# Patient Record
Sex: MALE | Race: BLACK OR AFRICAN AMERICAN | Employment: PART TIME | URBAN - METROPOLITAN AREA
[De-identification: names, ages, dates, MRNs, and addresses within clinical notes are randomized per-mention and may not be internally consistent; named-entity substitution may affect disease eponyms.]

---

## 2024-10-30 ENCOUNTER — OFFICE VISIT (OUTPATIENT)
Facility: CLINIC | Age: 24
End: 2024-10-30

## 2024-10-30 DIAGNOSIS — R21 SKIN RASH: ICD-10-CM

## 2024-10-30 DIAGNOSIS — Z13.0 SCREENING FOR DEFICIENCY ANEMIA: ICD-10-CM

## 2024-10-30 DIAGNOSIS — Z13.29 SCREENING FOR THYROID DISORDER: ICD-10-CM

## 2024-10-30 DIAGNOSIS — Z72.0 TOBACCO USE: ICD-10-CM

## 2024-10-30 DIAGNOSIS — Z13.89 SCREENING FOR HEMATURIA OR PROTEINURIA: ICD-10-CM

## 2024-10-30 DIAGNOSIS — Z11.3 SCREEN FOR STD (SEXUALLY TRANSMITTED DISEASE): ICD-10-CM

## 2024-10-30 DIAGNOSIS — I86.1 VARICOCELE: ICD-10-CM

## 2024-10-30 DIAGNOSIS — Z76.89 ENCOUNTER TO ESTABLISH CARE: Primary | ICD-10-CM

## 2024-10-30 DIAGNOSIS — E55.9 VITAMIN D DEFICIENCY: ICD-10-CM

## 2024-10-30 DIAGNOSIS — Z13.220 SCREENING CHOLESTEROL LEVEL: ICD-10-CM

## 2024-10-30 DIAGNOSIS — Z13.1 SCREENING FOR DIABETES MELLITUS: ICD-10-CM

## 2024-10-30 RX ORDER — TRIAMCINOLONE ACETONIDE 0.25 MG/G
OINTMENT TOPICAL
Qty: 15 G | Refills: 0 | Status: SHIPPED | OUTPATIENT
Start: 2024-10-30 | End: 2024-11-06

## 2024-10-30 SDOH — HEALTH STABILITY: PHYSICAL HEALTH: ON AVERAGE, HOW MANY MINUTES DO YOU ENGAGE IN EXERCISE AT THIS LEVEL?: 60 MIN

## 2024-10-30 SDOH — HEALTH STABILITY: PHYSICAL HEALTH: ON AVERAGE, HOW MANY DAYS PER WEEK DO YOU ENGAGE IN MODERATE TO STRENUOUS EXERCISE (LIKE A BRISK WALK)?: 2 DAYS

## 2024-10-30 ASSESSMENT — ENCOUNTER SYMPTOMS
COUGH: 0
DIARRHEA: 0
VOMITING: 0
ABDOMINAL PAIN: 0
NAUSEA: 0
CONSTIPATION: 0
SORE THROAT: 0

## 2024-10-30 NOTE — PATIENT INSTRUCTIONS
Call [phone] to schedule your imaging study with Johnny STEPHENSON: You may receive a patient survey; the provider rating is based on your encounter with me specifically and NOT the staff/facility. Looking forward to your comments.          Patient Information     [facility] is dedicated to improving your health. We are excited to have you as a patient. To provide the best care, we need a good plan. To optimize your safety and care, we ask you to follow and be aware of some important policies and procedures.    A. Arrive 20 minutes prior to your appointment. Arriving prior to your scheduled medical visit allows time to complete check- in paperwork prior to seeing the physician.    B. Not showing-up for an appointment without letting your provider know leaves the practice in a difficult position and prevents other patients from being able to use the appointment slot. We are always trying to provide better access to our patients and this will help us in that goal.    C. If you are unable to keep an appointment, please call 24 hours before your appointment if at all possible. Another patient needing care might be served in the event you cannot make your appointment.    D. Bring all of your medication bottles (except those that must be refrigerated) and supplements with you to your appointment. This enables the provider to accurately assess medication needs and make important changes as needed.    E. Please seek to get your medications refilled during your scheduled appointments. This will decrease wait time for refills to be processed. When you bring all medications with you the day of your appointment, we will prescribe enough medications to last until your next appointment. Prescriptions cannot be filled after office hours, on weekends/holidays, or any other time the office is closed.    F. If you have any forms to be completed, please mention that when making your appointment. Please send the

## 2024-10-30 NOTE — PROGRESS NOTES
[address] [place] [address]               [phone]        Js Rowland is a 23 y.o. male and presents with New Patient           Assessment/Plan:  Js was seen today for new patient.    Diagnoses and all orders for this visit:    Encounter to establish care    Varicocele  -     Cancel: [facility]  -     US SCROTUM AND TESTICLES; Future    Screening for diabetes mellitus  -     Hemoglobin A1C; Future  -     Comprehensive Metabolic Panel; Future    Screening cholesterol level  -     Lipid Panel; Future    Screening for hematuria or proteinuria  -     Urinalysis with Microscopic; Future    Screening for thyroid disorder  -     TSH; Future    Screening for deficiency anemia  -     CBC with Auto Differential; Future    Screen for STD (sexually transmitted disease)  -     RPR; Future  -     HSV 1 and 2 Specific Ab, IgG; Future  -     HIV 1/2 Ag/Ab, 4TH Generation,W Rflx Confirm; Future  -     Hepatitis C Ab, Rflx to Qt by PCR; Future  -     Chlamydia, Gonorrhea, Trichomoniasis; Future    Vitamin D deficiency  -     Vitamin D 25 Hydroxy; Future    Skin rash  -     triamcinolone (KENALOG) 0.025 % ointment; Apply topically 2 times daily max 2 weeks    Tobacco use      Update imaging to further assess; recommend supportive care; no longer tender today;     Screening labs ordered    Update STD screen; continue use of condoms    Skin rash: triamcinolone recommended topically; max 2 weeks use; encouraged aquaphor/vaseline topically.    Encouraged tobacco cessation      Follow up and disposition:   Return in about 1 week (around 11/6/2024), or if symptoms worsen or fail to improve, for labs, follow up -15 min, review lab results.      Health Maintenance:   Health Maintenance   Topic Date Due    Varicella vaccine (2 of 2 - 2-dose childhood series) 12/29/2006    Depression Screen  Never done    HPV vaccine (1 - Male 3-dose series) Never done    HIV

## 2024-11-06 ENCOUNTER — OFFICE VISIT (OUTPATIENT)
Facility: CLINIC | Age: 24
End: 2024-11-06
Payer: MEDICAID

## 2024-11-06 DIAGNOSIS — Z72.0 TOBACCO USE: ICD-10-CM

## 2024-11-06 DIAGNOSIS — B35.6 TINEA CRURIS: ICD-10-CM

## 2024-11-06 DIAGNOSIS — R21 SKIN RASH: Primary | ICD-10-CM

## 2024-11-06 PROCEDURE — 99214 OFFICE O/P EST MOD 30 MIN: CPT | Performed by: FAMILY MEDICINE

## 2024-11-06 RX ORDER — CLOTRIMAZOLE 1 %
CREAM (GRAM) TOPICAL
Qty: 60 G | Refills: 1 | Status: SHIPPED | OUTPATIENT
Start: 2024-11-06 | End: 2024-11-13

## 2024-11-06 SDOH — ECONOMIC STABILITY: FOOD INSECURITY: WITHIN THE PAST 12 MONTHS, THE FOOD YOU BOUGHT JUST DIDN'T LAST AND YOU DIDN'T HAVE MONEY TO GET MORE.: NEVER TRUE

## 2024-11-06 SDOH — ECONOMIC STABILITY: FOOD INSECURITY: WITHIN THE PAST 12 MONTHS, YOU WORRIED THAT YOUR FOOD WOULD RUN OUT BEFORE YOU GOT MONEY TO BUY MORE.: NEVER TRUE

## 2024-11-06 SDOH — ECONOMIC STABILITY: INCOME INSECURITY: HOW HARD IS IT FOR YOU TO PAY FOR THE VERY BASICS LIKE FOOD, HOUSING, MEDICAL CARE, AND HEATING?: NOT HARD AT ALL

## 2024-11-06 ASSESSMENT — PATIENT HEALTH QUESTIONNAIRE - PHQ9
SUM OF ALL RESPONSES TO PHQ QUESTIONS 1-9: 0
SUM OF ALL RESPONSES TO PHQ QUESTIONS 1-9: 0
1. LITTLE INTEREST OR PLEASURE IN DOING THINGS: NOT AT ALL
2. FEELING DOWN, DEPRESSED OR HOPELESS: NOT AT ALL
SUM OF ALL RESPONSES TO PHQ QUESTIONS 1-9: 0
SUM OF ALL RESPONSES TO PHQ9 QUESTIONS 1 & 2: 0
SUM OF ALL RESPONSES TO PHQ QUESTIONS 1-9: 0

## 2024-11-06 NOTE — PROGRESS NOTES
[address] [place] [address]               [phone]        Js Rowland is a 23 y.o. male and presents with Follow-up           Assessment/Plan:  Js was seen today for follow-up.    Diagnoses and all orders for this visit:    Skin rash    Tobacco use    Tinea cruris  -     clotrimazole (LOTRIMIN AF) 1 % cream; Apply topically 2 times daily prn jock itch        Skin rash: encouraged to apply triamcinolone to neck; limit use in axilla now that rash has improved  Begin antifungal for tinea cruris; admits to sweating/not washing as regularly; encouraged to pat dry to avoid moisture retention; f/u in 2 weeks if still symptomatic  Encouraged tobacco cessation    Follow up and disposition:   Return in about 8 days (around 11/14/2024), or if symptoms worsen or fail to improve, for labs/ 12/4/2024, review lab results.      Health Maintenance:   Health Maintenance   Topic Date Due    Varicella vaccine (2 of 2 - 2-dose childhood series) 12/29/2006    Pneumococcal 0-64 years Vaccine (1 of 2 - PCV) Never done    Depression Screen  Never done    HPV vaccine (1 - Male 3-dose series) Never done    HIV screen  Never done    Hepatitis C screen  Never done    Hepatitis B vaccine (1 of 3 - 19+ 3-dose series) Never done    DTaP/Tdap/Td vaccine (6 - Td or Tdap) 09/03/2023    Flu vaccine (1) 08/01/2024    COVID-19 Vaccine (1 - 2023-24 season) Never done    Hepatitis A vaccine  Completed    Hib vaccine  Completed    Polio vaccine  Completed    Meningococcal (ACWY) vaccine  Aged Out        Subjective     22 y/o male here for follow up  Has not completed labs preordered from last visit  Encouraged tobacco cessation last visit; not ready to quit    Given triamcinolone for skin rash last visit--just started taking it 3 days ago; states triamcinolone has been helping; states he has been using it once/day and symptoms improving but not fully resolved    States he has noted itching/burning

## 2024-11-14 ENCOUNTER — HOSPITAL ENCOUNTER (OUTPATIENT)
Facility: HOSPITAL | Age: 24
Setting detail: SPECIMEN
Discharge: HOME | End: 2024-11-17

## 2024-11-14 LAB — SENTARA SPECIMEN COLLECTION: NORMAL

## 2024-11-14 PROCEDURE — 99001 SPECIMEN HANDLING PT-LAB: CPT

## 2024-11-15 LAB
A/G RATIO: 2.3 RATIO (ref 1.1–2.6)
ALBUMIN: 4.8 G/DL (ref 3.5–5)
ALP BLD-CCNC: 64 U/L (ref 25–115)
ALT SERPL-CCNC: 23 U/L (ref 5–40)
ANION GAP SERPL CALCULATED.3IONS-SCNC: 12 MMOL/L (ref 3–15)
AST SERPL-CCNC: 21 U/L (ref 10–37)
BACTERIA: NEGATIVE
BASOPHILS ABSOLUTE: 0 K/UL (ref 0–0.2)
BASOPHILS RELATIVE PERCENT: 1 % (ref 0–2)
BILIRUB SERPL-MCNC: 0.9 MG/DL (ref 0.2–1.2)
BILIRUB SERPL-MCNC: NEGATIVE MG/DL
BLOOD: NEGATIVE
BUN BLDV-MCNC: 9 MG/DL (ref 6–22)
CALCIUM SERPL-MCNC: 9.8 MG/DL (ref 8.4–10.5)
CHLAMYDIA AMPLIFIED URINE: NEGATIVE
CHLORIDE BLD-SCNC: 103 MMOL/L (ref 98–110)
CHOLESTEROL, TOTAL: 204 MG/DL (ref 110–200)
CHOLESTEROL/HDL RATIO: 2.8 (ref 0–5)
CLARITY, UA: CLEAR
CO2: 24 MMOL/L (ref 20–32)
COLOR, UA: YELLOW
CREAT SERPL-MCNC: 0.9 MG/DL (ref 0.5–1.2)
EOSINOPHIL # BLD: 3 % (ref 0–6)
EOSINOPHILS ABSOLUTE: 0.1 K/UL (ref 0–0.5)
EPITHELIAL CELLS: ABNORMAL /HPF
ESTIMATED AVERAGE GLUCOSE: 115 MG/DL (ref 91–123)
GC AMPLIFIED URINE: NEGATIVE
GFR, ESTIMATED: >60 ML/MIN/1.73 SQ.M.
GLOBULIN: 2.1 G/DL (ref 2–4)
GLUCOSE: 90 MG/DL (ref 70–99)
GLUCOSE: NEGATIVE MG/DL
HBA1C MFR BLD: 5.6 % (ref 4.8–5.6)
HCT VFR BLD CALC: 41.6 % (ref 36.6–51.9)
HDLC SERPL-MCNC: 73 MG/DL
HEMOGLOBIN: 13.5 G/DL (ref 13.2–17.3)
HEPATITIS C ANTIBODY: NORMAL
HERPES SIMPLEX VIRUS 1 IGG: <0.2 AI
HERPES SIMPLEX VIRUS 2 IGG: <0.2 AI
HIV -1/0/2 AG/AB WITH REFLEX: NON REACTIVE
HIV INTERPRETATION: NORMAL
HYALINE CASTS: ABNORMAL /LPF (ref 0–2)
KETONES, URINE: NEGATIVE MG/DL
LDL CHOLESTEROL: 123 MG/DL (ref 50–99)
LDL/HDL RATIO: 1.7
LEUKOCYTE ESTERASE, URINE: NEGATIVE
LYMPHOCYTES # BLD: 57 % (ref 20–45)
LYMPHOCYTES ABSOLUTE: 2 K/UL (ref 1–4.8)
MCH RBC QN AUTO: 29 PG (ref 26–34)
MCHC RBC AUTO-ENTMCNC: 33 G/DL (ref 31–36)
MCV RBC AUTO: 90 FL (ref 80–95)
MONOCYTES ABSOLUTE: 0.4 K/UL (ref 0.1–1)
MONOCYTES: 11 % (ref 3–12)
NEUTROPHILS ABSOLUTE: 1 K/UL (ref 1.8–7.7)
NEUTROPHILS: 29 % (ref 40–75)
NITRITE, URINE: NEGATIVE
NON-HDL CHOLESTEROL: 131 MG/DL
PDW BLD-RTO: 12.9 % (ref 10–15.5)
PH, URINE: 7 PH (ref 5–8)
PLATELET # BLD: 249 K/UL (ref 140–440)
PMV BLD AUTO: 8.9 FL (ref 9–13)
POTASSIUM SERPL-SCNC: 4.6 MMOL/L (ref 3.5–5.5)
PROTEIN UA: NEGATIVE MG/DL
RBC # BLD: 4.63 M/UL (ref 3.8–5.8)
RBC URINE: ABNORMAL /HPF
SODIUM BLD-SCNC: 139 MMOL/L (ref 133–145)
SPECIFIC GRAVITY UA: 1 (ref 1–1.03)
T. PALLIDUM, IGG/IGM: NON REACTIVE
TOTAL PROTEIN: 6.9 G/DL (ref 6.4–8.3)
TRICH NUCU: NEGATIVE
TRIGL SERPL-MCNC: 38 MG/DL (ref 40–149)
TSH SERPL DL<=0.05 MIU/L-ACNC: 2 MCU/ML (ref 0.27–4.2)
UROBILINOGEN, URINE: 0.2 MG/DL
VITAMIN D 25-HYDROXY: 22.9 NG/ML (ref 32–100)
VLDLC SERPL CALC-MCNC: 8 MG/DL (ref 8–30)
WBC # BLD: 3.5 K/UL (ref 4–11)
WBC UA: ABNORMAL /HPF (ref 0–2)